# Patient Record
Sex: MALE | Race: WHITE | NOT HISPANIC OR LATINO
[De-identification: names, ages, dates, MRNs, and addresses within clinical notes are randomized per-mention and may not be internally consistent; named-entity substitution may affect disease eponyms.]

---

## 2018-02-23 ENCOUNTER — APPOINTMENT (OUTPATIENT)
Dept: NEPHROLOGY | Facility: CLINIC | Age: 53
End: 2018-02-23
Payer: COMMERCIAL

## 2018-02-23 VITALS — SYSTOLIC BLOOD PRESSURE: 140 MMHG | DIASTOLIC BLOOD PRESSURE: 80 MMHG

## 2018-02-23 VITALS
SYSTOLIC BLOOD PRESSURE: 164 MMHG | HEIGHT: 67 IN | OXYGEN SATURATION: 94 % | WEIGHT: 200.62 LBS | DIASTOLIC BLOOD PRESSURE: 93 MMHG | BODY MASS INDEX: 31.49 KG/M2 | HEART RATE: 83 BPM

## 2018-02-23 DIAGNOSIS — N17.9 ACUTE KIDNEY FAILURE, UNSPECIFIED: ICD-10-CM

## 2018-02-23 PROCEDURE — 99244 OFF/OP CNSLTJ NEW/EST MOD 40: CPT

## 2018-02-23 RX ORDER — TAMSULOSIN HYDROCHLORIDE 0.4 MG/1
0.4 CAPSULE ORAL
Qty: 30 | Refills: 0 | Status: ACTIVE | COMMUNITY
Start: 2018-02-07

## 2018-02-23 RX ORDER — BROMPHENIRAMINE MALEATE, PSEUDOEPHEDRINE HYDROCHLORIDE, 2; 30; 10 MG/5ML; MG/5ML; MG/5ML
30-2-10 SYRUP ORAL
Qty: 150 | Refills: 0 | Status: ACTIVE | COMMUNITY
Start: 2018-01-14

## 2018-02-26 LAB
ALBUMIN SERPL ELPH-MCNC: 4.2 G/DL
ANION GAP SERPL CALC-SCNC: 12 MMOL/L
APPEARANCE: CLEAR
BACTERIA: NEGATIVE
BILIRUBIN URINE: NEGATIVE
BLOOD URINE: NEGATIVE
BUN SERPL-MCNC: 22 MG/DL
CALCIUM SERPL-MCNC: 10 MG/DL
CHLORIDE SERPL-SCNC: 100 MMOL/L
CO2 SERPL-SCNC: 28 MMOL/L
COLOR: YELLOW
CREAT SERPL-MCNC: 1.16 MG/DL
CREAT SPEC-SCNC: 82 MG/DL
CREAT/PROT UR: 0 RATIO
GLUCOSE QUALITATIVE U: NEGATIVE MG/DL
GLUCOSE SERPL-MCNC: 122 MG/DL
HYALINE CASTS: 0 /LPF
KETONES URINE: NEGATIVE
LEUKOCYTE ESTERASE URINE: NEGATIVE
MICROSCOPIC-UA: NORMAL
NITRITE URINE: NEGATIVE
PH URINE: 5
PHOSPHATE SERPL-MCNC: 2.5 MG/DL
POTASSIUM SERPL-SCNC: 4.3 MMOL/L
PROT UR-MCNC: 4 MG/DL
PROTEIN URINE: NEGATIVE MG/DL
RED BLOOD CELLS URINE: 3 /HPF
SODIUM SERPL-SCNC: 140 MMOL/L
SPECIFIC GRAVITY URINE: 1.02
SQUAMOUS EPITHELIAL CELLS: 0 /HPF
UROBILINOGEN URINE: NEGATIVE MG/DL
WHITE BLOOD CELLS URINE: 2 /HPF

## 2019-10-17 ENCOUNTER — OUTPATIENT (OUTPATIENT)
Dept: OUTPATIENT SERVICES | Facility: HOSPITAL | Age: 54
LOS: 1 days | End: 2019-10-17
Payer: COMMERCIAL

## 2019-10-17 ENCOUNTER — APPOINTMENT (OUTPATIENT)
Dept: CT IMAGING | Facility: CLINIC | Age: 54
End: 2019-10-17
Payer: COMMERCIAL

## 2019-10-17 DIAGNOSIS — Z00.8 ENCOUNTER FOR OTHER GENERAL EXAMINATION: ICD-10-CM

## 2019-10-17 DIAGNOSIS — Z98.89 OTHER SPECIFIED POSTPROCEDURAL STATES: Chronic | ICD-10-CM

## 2019-10-17 PROCEDURE — 70486 CT MAXILLOFACIAL W/O DYE: CPT

## 2019-10-17 PROCEDURE — 70486 CT MAXILLOFACIAL W/O DYE: CPT | Mod: 26

## 2021-02-08 ENCOUNTER — APPOINTMENT (OUTPATIENT)
Dept: ORTHOPEDIC SURGERY | Facility: CLINIC | Age: 56
End: 2021-02-08
Payer: MEDICARE

## 2021-02-08 VITALS — BODY MASS INDEX: 31.39 KG/M2 | TEMPERATURE: 97.9 F | WEIGHT: 200 LBS | HEIGHT: 67 IN

## 2021-02-08 DIAGNOSIS — M54.5 LOW BACK PAIN: ICD-10-CM

## 2021-02-08 DIAGNOSIS — Z80.9 FAMILY HISTORY OF MALIGNANT NEOPLASM, UNSPECIFIED: ICD-10-CM

## 2021-02-08 PROCEDURE — 99204 OFFICE O/P NEW MOD 45 MIN: CPT

## 2021-02-09 PROBLEM — Z80.9 FAMILY HISTORY OF MALIGNANT NEOPLASM: Status: ACTIVE | Noted: 2021-02-08

## 2021-02-09 RX ORDER — HYDROCODONE BITARTRATE AND ACETAMINOPHEN 10; 325 MG/1; MG/1
10-325 TABLET ORAL
Qty: 120 | Refills: 0 | Status: ACTIVE | COMMUNITY
Start: 2021-01-02

## 2021-02-09 RX ORDER — IBUPROFEN 800 MG/1
800 TABLET, FILM COATED ORAL
Qty: 14 | Refills: 0 | Status: ACTIVE | COMMUNITY
Start: 2020-12-22

## 2021-02-09 RX ORDER — HYDROCODONE BITARTRATE AND ACETAMINOPHEN 7.5; 325 MG/1; MG/1
7.5-325 TABLET ORAL
Qty: 60 | Refills: 0 | Status: COMPLETED | COMMUNITY
Start: 2020-12-11

## 2021-02-09 RX ORDER — OXYCODONE AND ACETAMINOPHEN 10; 325 MG/1; MG/1
10-325 TABLET ORAL
Qty: 28 | Refills: 0 | Status: COMPLETED | COMMUNITY
Start: 2020-12-22

## 2021-02-09 RX ORDER — DICLOFENAC SODIUM 1% 10 MG/G
1 GEL TOPICAL
Qty: 400 | Refills: 0 | Status: COMPLETED | COMMUNITY
Start: 2020-10-15

## 2021-04-26 ENCOUNTER — APPOINTMENT (OUTPATIENT)
Dept: ORTHOPEDIC SURGERY | Facility: CLINIC | Age: 56
End: 2021-04-26
Payer: MEDICARE

## 2021-04-26 VITALS
HEIGHT: 67 IN | SYSTOLIC BLOOD PRESSURE: 158 MMHG | HEART RATE: 76 BPM | BODY MASS INDEX: 29.19 KG/M2 | TEMPERATURE: 98 F | WEIGHT: 186 LBS | DIASTOLIC BLOOD PRESSURE: 104 MMHG

## 2021-04-26 DIAGNOSIS — M51.26 OTHER INTERVERTEBRAL DISC DISPLACEMENT, LUMBAR REGION: ICD-10-CM

## 2021-04-26 DIAGNOSIS — M48.062 SPINAL STENOSIS, LUMBAR REGION WITH NEUROGENIC CLAUDICATION: ICD-10-CM

## 2021-04-26 PROCEDURE — 99214 OFFICE O/P EST MOD 30 MIN: CPT

## 2021-04-29 ENCOUNTER — OUTPATIENT (OUTPATIENT)
Dept: OUTPATIENT SERVICES | Facility: HOSPITAL | Age: 56
LOS: 1 days | End: 2021-04-29
Payer: MEDICARE

## 2021-04-29 VITALS
DIASTOLIC BLOOD PRESSURE: 70 MMHG | WEIGHT: 194.01 LBS | RESPIRATION RATE: 14 BRPM | HEART RATE: 80 BPM | OXYGEN SATURATION: 97 % | HEIGHT: 66 IN | SYSTOLIC BLOOD PRESSURE: 129 MMHG | TEMPERATURE: 97 F

## 2021-04-29 DIAGNOSIS — Z98.89 OTHER SPECIFIED POSTPROCEDURAL STATES: Chronic | ICD-10-CM

## 2021-04-29 DIAGNOSIS — J86.9 PYOTHORAX WITHOUT FISTULA: Chronic | ICD-10-CM

## 2021-04-29 DIAGNOSIS — Z96.651 PRESENCE OF RIGHT ARTIFICIAL KNEE JOINT: Chronic | ICD-10-CM

## 2021-04-29 DIAGNOSIS — M48.062 SPINAL STENOSIS, LUMBAR REGION WITH NEUROGENIC CLAUDICATION: ICD-10-CM

## 2021-04-29 DIAGNOSIS — Z98.890 OTHER SPECIFIED POSTPROCEDURAL STATES: Chronic | ICD-10-CM

## 2021-04-29 DIAGNOSIS — Z01.818 ENCOUNTER FOR OTHER PREPROCEDURAL EXAMINATION: ICD-10-CM

## 2021-04-29 DIAGNOSIS — M51.26 OTHER INTERVERTEBRAL DISC DISPLACEMENT, LUMBAR REGION: ICD-10-CM

## 2021-04-29 LAB
ALBUMIN SERPL ELPH-MCNC: 3.8 G/DL — SIGNIFICANT CHANGE UP (ref 3.3–5)
ALP SERPL-CCNC: 57 U/L — SIGNIFICANT CHANGE UP (ref 30–120)
ALT FLD-CCNC: 41 U/L DA — SIGNIFICANT CHANGE UP (ref 10–60)
ANION GAP SERPL CALC-SCNC: 9 MMOL/L — SIGNIFICANT CHANGE UP (ref 5–17)
APTT BLD: 34 SEC — SIGNIFICANT CHANGE UP (ref 27.5–35.5)
AST SERPL-CCNC: 21 U/L — SIGNIFICANT CHANGE UP (ref 10–40)
BILIRUB SERPL-MCNC: 0.2 MG/DL — SIGNIFICANT CHANGE UP (ref 0.2–1.2)
BLD GP AB SCN SERPL QL: SIGNIFICANT CHANGE UP
BUN SERPL-MCNC: 19 MG/DL — SIGNIFICANT CHANGE UP (ref 7–23)
CALCIUM SERPL-MCNC: 9.6 MG/DL — SIGNIFICANT CHANGE UP (ref 8.4–10.5)
CHLORIDE SERPL-SCNC: 103 MMOL/L — SIGNIFICANT CHANGE UP (ref 96–108)
CO2 SERPL-SCNC: 30 MMOL/L — SIGNIFICANT CHANGE UP (ref 22–31)
CREAT SERPL-MCNC: 0.96 MG/DL — SIGNIFICANT CHANGE UP (ref 0.5–1.3)
GLUCOSE SERPL-MCNC: 143 MG/DL — HIGH (ref 70–99)
HCT VFR BLD CALC: 48.5 % — SIGNIFICANT CHANGE UP (ref 39–50)
HGB BLD-MCNC: 16.2 G/DL — SIGNIFICANT CHANGE UP (ref 13–17)
INR BLD: 0.98 RATIO — SIGNIFICANT CHANGE UP (ref 0.88–1.16)
MCHC RBC-ENTMCNC: 30.9 PG — SIGNIFICANT CHANGE UP (ref 27–34)
MCHC RBC-ENTMCNC: 33.4 GM/DL — SIGNIFICANT CHANGE UP (ref 32–36)
MCV RBC AUTO: 92.4 FL — SIGNIFICANT CHANGE UP (ref 80–100)
NRBC # BLD: 0 /100 WBCS — SIGNIFICANT CHANGE UP (ref 0–0)
PLATELET # BLD AUTO: 258 K/UL — SIGNIFICANT CHANGE UP (ref 150–400)
POTASSIUM SERPL-MCNC: 3.8 MMOL/L — SIGNIFICANT CHANGE UP (ref 3.5–5.3)
POTASSIUM SERPL-SCNC: 3.8 MMOL/L — SIGNIFICANT CHANGE UP (ref 3.5–5.3)
PROT SERPL-MCNC: 7.5 G/DL — SIGNIFICANT CHANGE UP (ref 6–8.3)
PROTHROM AB SERPL-ACNC: 11.9 SEC — SIGNIFICANT CHANGE UP (ref 10.6–13.6)
RBC # BLD: 5.25 M/UL — SIGNIFICANT CHANGE UP (ref 4.2–5.8)
RBC # FLD: 11.6 % — SIGNIFICANT CHANGE UP (ref 10.3–14.5)
SODIUM SERPL-SCNC: 142 MMOL/L — SIGNIFICANT CHANGE UP (ref 135–145)
WBC # BLD: 9.39 K/UL — SIGNIFICANT CHANGE UP (ref 3.8–10.5)
WBC # FLD AUTO: 9.39 K/UL — SIGNIFICANT CHANGE UP (ref 3.8–10.5)

## 2021-04-29 PROCEDURE — 93005 ELECTROCARDIOGRAM TRACING: CPT

## 2021-04-29 PROCEDURE — 93010 ELECTROCARDIOGRAM REPORT: CPT

## 2021-04-29 PROCEDURE — G0463: CPT

## 2021-04-29 NOTE — H&P PST ADULT - VISION (WITH CORRECTIVE LENSES IF THE PATIENT USUALLY WEARS THEM):
+ reading/Partially impaired: cannot see medication labels or newsprint, but can see obstacles in path, and the surrounding layout; can count fingers at arm's length

## 2021-04-29 NOTE — H&P PST ADULT - HISTORY OF PRESENT ILLNESS
.This is a 56 y/o male  .This is a 54 y/o male presents to [facility] with complaint of severe back pain radiating down to left leg with spasms. He has been experiencing stabbing pain since Thanksgiving. Had been treated with MELLY x3 , PT with no relief . scheduled for decompressive laminectomy and discectomy on 5./11/21

## 2021-04-29 NOTE — H&P PST ADULT - PAIN DESCRIPTION (FREQUENCY/QUALITY), PROFILE
mostly w ambulation/aching/cramping/sharp/soreness/spasm/throbbing mostly w ambulation/constant/aching/cramping/sharp/soreness/spasm/throbbing

## 2021-04-29 NOTE — H&P PST ADULT - NSICDXFAMILYHX_GEN_ALL_CORE_FT
FAMILY HISTORY:  Father  Still living? No  Family history of liver cancer, Age at diagnosis: Age Unknown    Mother  Still living? Yes, Estimated age: 71-80  Family history of herpes simplex keratoconjunctivitis, Age at diagnosis: Age Unknown

## 2021-04-29 NOTE — H&P PST ADULT - PRO TOBACCO QUIT ATTEMPTS
oral cessation medication/nicotine replacement therapy oral cessation medication/counseling/nicotine replacement therapy

## 2021-04-29 NOTE — H&P PST ADULT - ASSESSMENT
NPO after Midnight. Take pepcid as ordered. Medical Clearance and Derm note to be obtained.  Nose cx instructions reviewed. Dietary instructions reviewed. 3day wipes reviewed. D/C instructions reviewed.  Patient advised of no jewelry day of surgery.  Handouts given.  Medication reviewed. Stop NSAIDS, ASA herbals, vit E per PMD instructions or 7 days prior to surgery .  O.A. D.J.D right knee Day of Surgery you can take the following meds with a small sip of water. lisinopril and hydrocodone 56 y/o male with lumbar radiculopathy     scheduled for decompressive laminectomy and discectomy   Medical clearance   Pre op instructions  COVID testing on 5/9/21 at 11am

## 2021-04-29 NOTE — H&P PST ADULT - NSICDXPASTMEDICALHX_GEN_ALL_CORE_FT
PAST MEDICAL HISTORY:  Allergic rhinitis     Asthmatic bronchitis     Chronic back pain greater than 3 months duration takes hydrocodone    Hypertension      PAST MEDICAL HISTORY:  Allergic rhinitis     Asthmatic bronchitis     Current smoker     Hypertension     Lumbar herniated disc     Lumbar radiculopathy     Psoriasis

## 2021-04-29 NOTE — H&P PST ADULT - MUSCULOSKELETAL
right knee and lumbar/decreased ROM/decreased ROM due to pain details… detailed exam decreased ROM/decreased ROM due to pain

## 2021-04-29 NOTE — H&P PST ADULT - OTHER CARE PROVIDERS
Dr Oral Disla  Dr Shepherd [phone]  Derm Dr Oral Disla  pain management Dr Shepherd [phone]  Derm

## 2021-04-29 NOTE — H&P PST ADULT - NSICDXPASTSURGICALHX_GEN_ALL_CORE_FT
PAST SURGICAL HISTORY:  Empyema, right VATs procedule    S/P colonoscopy     S/P total knee replacement, right 2015     PAST SURGICAL HISTORY:  History of thoracic surgery for empyema VATS ? 2018 hospitalized  at [facility]    S/P total knee replacement, right 2015

## 2021-05-06 PROBLEM — L40.9 PSORIASIS, UNSPECIFIED: Chronic | Status: ACTIVE | Noted: 2021-04-29

## 2021-05-06 PROBLEM — F17.200 NICOTINE DEPENDENCE, UNSPECIFIED, UNCOMPLICATED: Chronic | Status: ACTIVE | Noted: 2021-04-29

## 2021-05-06 PROBLEM — M54.16 RADICULOPATHY, LUMBAR REGION: Chronic | Status: ACTIVE | Noted: 2021-04-29

## 2021-05-06 PROBLEM — M51.26 OTHER INTERVERTEBRAL DISC DISPLACEMENT, LUMBAR REGION: Chronic | Status: ACTIVE | Noted: 2021-04-29

## 2021-05-06 NOTE — PROVIDER CONTACT NOTE (OTHER) - ASSESSMENT
The Spine Pre-Operative Education packet was given to the patient on 4/26/21. The patient and NP reviewed the information included in the packet. All his questions were answered and he gave a clear understanding of the instructions. He was advised to call the office at any time with further questions or concerns.

## 2021-05-09 ENCOUNTER — OUTPATIENT (OUTPATIENT)
Dept: OUTPATIENT SERVICES | Facility: HOSPITAL | Age: 56
LOS: 1 days | End: 2021-05-09
Payer: MEDICARE

## 2021-05-09 DIAGNOSIS — Z01.818 ENCOUNTER FOR OTHER PREPROCEDURAL EXAMINATION: ICD-10-CM

## 2021-05-09 DIAGNOSIS — Z20.828 CONTACT WITH AND (SUSPECTED) EXPOSURE TO OTHER VIRAL COMMUNICABLE DISEASES: ICD-10-CM

## 2021-05-09 DIAGNOSIS — Z98.890 OTHER SPECIFIED POSTPROCEDURAL STATES: Chronic | ICD-10-CM

## 2021-05-09 DIAGNOSIS — M48.062 SPINAL STENOSIS, LUMBAR REGION WITH NEUROGENIC CLAUDICATION: ICD-10-CM

## 2021-05-09 DIAGNOSIS — M51.26 OTHER INTERVERTEBRAL DISC DISPLACEMENT, LUMBAR REGION: ICD-10-CM

## 2021-05-09 DIAGNOSIS — Z96.651 PRESENCE OF RIGHT ARTIFICIAL KNEE JOINT: Chronic | ICD-10-CM

## 2021-05-09 LAB — SARS-COV-2 RNA SPEC QL NAA+PROBE: SIGNIFICANT CHANGE UP

## 2021-05-09 PROCEDURE — U0003: CPT

## 2021-05-09 PROCEDURE — U0005: CPT

## 2021-05-10 ENCOUNTER — APPOINTMENT (OUTPATIENT)
Dept: ORTHOPEDIC SURGERY | Facility: CLINIC | Age: 56
End: 2021-05-10

## 2021-05-10 ENCOUNTER — TRANSCRIPTION ENCOUNTER (OUTPATIENT)
Age: 56
End: 2021-05-10

## 2021-05-10 NOTE — ASU PATIENT PROFILE, ADULT - PSH
History of thoracic surgery  for empyema VATS ? 2018 hospitalized  at [facility]  S/P total knee replacement, right  2015

## 2021-05-10 NOTE — ASU PATIENT PROFILE, ADULT - PMH
Allergic rhinitis    Asthmatic bronchitis    Current smoker    Hypertension    Lumbar herniated disc    Lumbar radiculopathy    Psoriasis

## 2021-05-11 ENCOUNTER — OUTPATIENT (OUTPATIENT)
Dept: OUTPATIENT SERVICES | Facility: HOSPITAL | Age: 56
LOS: 1 days | End: 2021-05-11
Payer: MEDICARE

## 2021-05-11 ENCOUNTER — RESULT REVIEW (OUTPATIENT)
Age: 56
End: 2021-05-11

## 2021-05-11 ENCOUNTER — APPOINTMENT (OUTPATIENT)
Dept: ORTHOPEDIC SURGERY | Facility: HOSPITAL | Age: 56
End: 2021-05-11

## 2021-05-11 VITALS
DIASTOLIC BLOOD PRESSURE: 77 MMHG | TEMPERATURE: 98 F | HEART RATE: 79 BPM | RESPIRATION RATE: 16 BRPM | SYSTOLIC BLOOD PRESSURE: 135 MMHG | WEIGHT: 189.6 LBS | HEIGHT: 65 IN | OXYGEN SATURATION: 97 %

## 2021-05-11 VITALS
DIASTOLIC BLOOD PRESSURE: 95 MMHG | HEART RATE: 85 BPM | OXYGEN SATURATION: 96 % | RESPIRATION RATE: 17 BRPM | SYSTOLIC BLOOD PRESSURE: 121 MMHG

## 2021-05-11 DIAGNOSIS — Z98.890 OTHER SPECIFIED POSTPROCEDURAL STATES: Chronic | ICD-10-CM

## 2021-05-11 DIAGNOSIS — Z01.818 ENCOUNTER FOR OTHER PREPROCEDURAL EXAMINATION: ICD-10-CM

## 2021-05-11 DIAGNOSIS — Z96.651 PRESENCE OF RIGHT ARTIFICIAL KNEE JOINT: Chronic | ICD-10-CM

## 2021-05-11 DIAGNOSIS — M48.062 SPINAL STENOSIS, LUMBAR REGION WITH NEUROGENIC CLAUDICATION: ICD-10-CM

## 2021-05-11 DIAGNOSIS — M51.26 OTHER INTERVERTEBRAL DISC DISPLACEMENT, LUMBAR REGION: ICD-10-CM

## 2021-05-11 PROCEDURE — 63030 LAMOT DCMPRN NRV RT 1 LMBR: CPT | Mod: 59,LT

## 2021-05-11 PROCEDURE — 63030 LAMOT DCMPRN NRV RT 1 LMBR: CPT | Mod: 82,59,LT

## 2021-05-11 PROCEDURE — 88304 TISSUE EXAM BY PATHOLOGIST: CPT | Mod: 26

## 2021-05-11 PROCEDURE — 63267 EXCISE INTRSPINL LESION LMBR: CPT

## 2021-05-11 PROCEDURE — 88311 DECALCIFY TISSUE: CPT

## 2021-05-11 PROCEDURE — 88311 DECALCIFY TISSUE: CPT | Mod: 26

## 2021-05-11 PROCEDURE — 76000 FLUOROSCOPY <1 HR PHYS/QHP: CPT

## 2021-05-11 PROCEDURE — 63047 LAM FACETEC & FORAMOT LUMBAR: CPT

## 2021-05-11 PROCEDURE — C1889: CPT

## 2021-05-11 PROCEDURE — 88304 TISSUE EXAM BY PATHOLOGIST: CPT

## 2021-05-11 PROCEDURE — 63048 LAM FACETEC &FORAMOT EA ADDL: CPT

## 2021-05-11 PROCEDURE — 97161 PT EVAL LOW COMPLEX 20 MIN: CPT

## 2021-05-11 PROCEDURE — 63267 EXCISE INTRSPINL LESION LMBR: CPT | Mod: 82

## 2021-05-11 RX ORDER — APREPITANT 80 MG/1
40 CAPSULE ORAL ONCE
Refills: 0 | Status: COMPLETED | OUTPATIENT
Start: 2021-05-11 | End: 2021-05-11

## 2021-05-11 RX ORDER — CEFAZOLIN SODIUM 1 G
2000 VIAL (EA) INJECTION ONCE
Refills: 0 | Status: DISCONTINUED | OUTPATIENT
Start: 2021-05-11 | End: 2021-05-11

## 2021-05-11 RX ORDER — ONDANSETRON 8 MG/1
4 TABLET, FILM COATED ORAL ONCE
Refills: 0 | Status: COMPLETED | OUTPATIENT
Start: 2021-05-11 | End: 2021-05-11

## 2021-05-11 RX ORDER — DIAZEPAM 5 MG
1 TABLET ORAL
Qty: 10 | Refills: 0
Start: 2021-05-11

## 2021-05-11 RX ORDER — OXYCODONE HYDROCHLORIDE 5 MG/1
5 TABLET ORAL ONCE
Refills: 0 | Status: DISCONTINUED | OUTPATIENT
Start: 2021-05-11 | End: 2021-05-11

## 2021-05-11 RX ORDER — HYDROMORPHONE HYDROCHLORIDE 2 MG/ML
0.5 INJECTION INTRAMUSCULAR; INTRAVENOUS; SUBCUTANEOUS
Refills: 0 | Status: DISCONTINUED | OUTPATIENT
Start: 2021-05-11 | End: 2021-05-11

## 2021-05-11 RX ORDER — ALBUTEROL 90 UG/1
2 AEROSOL, METERED ORAL
Qty: 0 | Refills: 0 | DISCHARGE

## 2021-05-11 RX ORDER — DIAZEPAM 5 MG
5 TABLET ORAL ONCE
Refills: 0 | Status: DISCONTINUED | OUTPATIENT
Start: 2021-05-11 | End: 2021-05-11

## 2021-05-11 RX ORDER — SODIUM CHLORIDE 9 MG/ML
1000 INJECTION, SOLUTION INTRAVENOUS
Refills: 0 | Status: DISCONTINUED | OUTPATIENT
Start: 2021-05-11 | End: 2021-05-11

## 2021-05-11 RX ADMIN — OXYCODONE HYDROCHLORIDE 5 MILLIGRAM(S): 5 TABLET ORAL at 12:33

## 2021-05-11 RX ADMIN — HYDROMORPHONE HYDROCHLORIDE 0.5 MILLIGRAM(S): 2 INJECTION INTRAMUSCULAR; INTRAVENOUS; SUBCUTANEOUS at 10:30

## 2021-05-11 RX ADMIN — SODIUM CHLORIDE 75 MILLILITER(S): 9 INJECTION, SOLUTION INTRAVENOUS at 10:30

## 2021-05-11 RX ADMIN — ONDANSETRON 4 MILLIGRAM(S): 8 TABLET, FILM COATED ORAL at 10:30

## 2021-05-11 RX ADMIN — HYDROMORPHONE HYDROCHLORIDE 0.5 MILLIGRAM(S): 2 INJECTION INTRAMUSCULAR; INTRAVENOUS; SUBCUTANEOUS at 10:50

## 2021-05-11 RX ADMIN — OXYCODONE HYDROCHLORIDE 5 MILLIGRAM(S): 5 TABLET ORAL at 11:55

## 2021-05-11 RX ADMIN — APREPITANT 40 MILLIGRAM(S): 80 CAPSULE ORAL at 07:42

## 2021-05-11 NOTE — ASU DISCHARGE PLAN (ADULT/PEDIATRIC) - CALL YOUR DOCTOR IF YOU HAVE ANY OF THE FOLLOWING:
Bleeding that does not stop/Fever greater than (need to indicate Fahrenheit or Celsius)/Wound/Surgical Site with redness, or foul smelling discharge or pus/Unable to urinate/Inability to tolerate liquids or foods Bleeding that does not stop/Pain not relieved by Medications/Fever greater than (need to indicate Fahrenheit or Celsius)/Wound/Surgical Site with redness, or foul smelling discharge or pus/Numbness, tingling, color or temperature change to extremity/Unable to urinate/Inability to tolerate liquids or foods

## 2021-05-11 NOTE — PHYSICAL THERAPY INITIAL EVALUATION ADULT - ADDITIONAL COMMENTS
Pt lives in private home with no MARIZA and 12 steps inside +HR. Pt states he occasionally brought a SAC with him for outdoor ambulation but rarely used.

## 2021-05-11 NOTE — BRIEF OPERATIVE NOTE - NSICDXBRIEFPROCEDURE_GEN_ALL_CORE_FT
PROCEDURES:  Lumbar laminectomy with microdiscectomy 11-May-2021 10:24:20 Left L5-S1 and L4-5 left laminectomy Virgilio Peralta

## 2021-05-11 NOTE — ASU DISCHARGE PLAN (ADULT/PEDIATRIC) - ASU DC SPECIAL INSTRUCTIONSFT
You have just had spine surgery.  Please take care of your back by adhering to some basic recommendations.    Continue your pain medication per your pain management provider.     Your surgeon recommends taking acetaminophen (tylenol) 1000mg 3 times per day for the next 14 to 21 days if you do not use any other acetaminophen  (Tylenol) containing product.  Do NOT use tylenol(acetaminophen) if using Vicodin (hydrocodone/acetaminophen.)    You have been given a prescription for diazepam to use if you have muscle spasms.      Apply icepacks to the surgical area to reduce swelling and discomfort.  This can help to minimize the need for stronger medications.    No heavy lifting. Do not lift more than 10 pounds.  Avoid twisting and bending over.  Do NOT drive a car.  You may be driven in a car.    You may shower as the dressing is the clear plastic type.   Change dressing with dry, sterile gauze and tape if it becomes loose, wet or soiled.  Cover any other dressing with plastic secured with tape when showering.       Observe low back precautions as described by the Physical Therapist.  Walking is your best exercise.  It is best not to remain in one position for long periods such as long car rides.    Constipation is a common problem associated with surgery and pain medications.  You should ensure that you are drinking plenty of fluids and increasing your fruit and vegetable intake.  You are advised to take Docusate 100mg three times per day to prevent opioid induced constipation.  To treat opioid induced constipation use Senna (Senokot) or Bisacodyl (Dulcolax) or PEG 3350 (Miralax) every evening until your first bowel movement and then every evening as needed.  To treat opioid induced bloating and gas pain use Simethicone (Gas-X) 80 mg per label directions.     Smoking should be avoided.  Tobacco products are associated with back problems.       Call the doctor with questions, fevers, severe headache, bowel or bladder dysfunction, new numbness/weakness or new pain not relieved by medication, ice pack and change of position.    You should see your surgeon for follow up within 14 days of surgery.

## 2021-05-11 NOTE — BRIEF OPERATIVE NOTE - NSICDXBRIEFPOSTOP_GEN_ALL_CORE_FT
POST-OP DIAGNOSIS:  Spinal stenosis, lumbar region without neurogenic claudication 11-May-2021 10:27:29 L4-5 Left , L5-S1 Left Virgilio Peralta  HNP (herniated nucleus pulposus), lumbar 11-May-2021 10:27:19 L5-S1 left Virgilio Peralta

## 2021-05-11 NOTE — BRIEF OPERATIVE NOTE - NSICDXBRIEFPREOP_GEN_ALL_CORE_FT
PRE-OP DIAGNOSIS:  HNP (herniated nucleus pulposus), lumbar 11-May-2021 10:26:46 L5-S1 left Virgilio Peralta  Spinal stenosis, lumbar region without neurogenic claudication 11-May-2021 10:26:20 L4-5 Left , L5-S1 Left Virgilio Peralta

## 2021-05-11 NOTE — ASU PREOP CHECKLIST - VIA
Care After Your Cataract Surgery  Dr. Khalil, Dr. Pat  [phone] or [phone]  Activity  • For the next 24 hours: Do not stay alone, have a responsible adult with you overnight.  The effects of anesthesia may stay with you for as long as 24 hours.     • Do not drive a car, operate electrical/power tools or appliances.  Do not lift more than 10 pounds. If a child, no bicycle riding, gymnastics, swimming, etc.    • Do not drink alcohol, including beer. Alcohol enhances the effect of anesthesia and sedation.    • Do not sign any legal papers.    Bathing  • You may bathe normally.    Diet   • You may eat a regular diet.    Medications  Take one Diamox pill when you get home from surgery.  Then take one pill every six hours until gone.  You do not have to get up during the night to take a pill.  Do not take if allergic to Sulfa.  Bring these instructions and your medications with you to your office visits.        Special Instructions:  • You may bend over.    • Do not rub the eye.    • The local anesthetic will gradually wear off today.  You may experience a headache or pricking sensation around the eye.  You may take Tylenol every 4 hours as needed for pain.    • If you used drops prior to surgery, continue to use them as directed.   •  If you wear glasses, wear those during the day; they will protect your eye, wear sunglasses in bright light (sun or snow glare).    • Use the shield during the first night after surgery    Call the doctor if you have:  • Severe pain.  • Extreme change in vision.    • Increase in redness in and around the eye.    
stretcher

## 2021-05-12 ENCOUNTER — NON-APPOINTMENT (OUTPATIENT)
Age: 56
End: 2021-05-12

## 2021-05-18 ENCOUNTER — NON-APPOINTMENT (OUTPATIENT)
Age: 56
End: 2021-05-18

## 2021-05-18 RX ORDER — DIAZEPAM 5 MG/1
5 TABLET ORAL
Qty: 20 | Refills: 0 | Status: ACTIVE | COMMUNITY
Start: 2021-05-18 | End: 1900-01-01

## 2021-05-26 ENCOUNTER — APPOINTMENT (OUTPATIENT)
Dept: ORTHOPEDIC SURGERY | Facility: CLINIC | Age: 56
End: 2021-05-26
Payer: MEDICARE

## 2021-05-26 VITALS — HEART RATE: 72 BPM | TEMPERATURE: 97.8 F | DIASTOLIC BLOOD PRESSURE: 83 MMHG | SYSTOLIC BLOOD PRESSURE: 131 MMHG

## 2021-05-26 PROCEDURE — 72100 X-RAY EXAM L-S SPINE 2/3 VWS: CPT

## 2021-05-26 PROCEDURE — 99024 POSTOP FOLLOW-UP VISIT: CPT

## 2021-07-07 ENCOUNTER — APPOINTMENT (OUTPATIENT)
Dept: ORTHOPEDIC SURGERY | Facility: CLINIC | Age: 56
End: 2021-07-07
Payer: MEDICARE

## 2021-07-07 VITALS — SYSTOLIC BLOOD PRESSURE: 133 MMHG | DIASTOLIC BLOOD PRESSURE: 82 MMHG | HEART RATE: 59 BPM

## 2021-07-07 DIAGNOSIS — Z98.890 OTHER SPECIFIED POSTPROCEDURAL STATES: ICD-10-CM

## 2021-07-07 PROCEDURE — 99024 POSTOP FOLLOW-UP VISIT: CPT

## 2022-03-02 ENCOUNTER — NON-APPOINTMENT (OUTPATIENT)
Age: 57
End: 2022-03-02

## 2022-03-02 ENCOUNTER — APPOINTMENT (OUTPATIENT)
Dept: ORTHOPEDIC SURGERY | Facility: CLINIC | Age: 57
End: 2022-03-02
Payer: MEDICARE

## 2022-03-02 VITALS — HEART RATE: 66 BPM | SYSTOLIC BLOOD PRESSURE: 139 MMHG | DIASTOLIC BLOOD PRESSURE: 88 MMHG

## 2022-03-02 DIAGNOSIS — M54.2 CERVICALGIA: ICD-10-CM

## 2022-03-02 PROCEDURE — 72050 X-RAY EXAM NECK SPINE 4/5VWS: CPT

## 2022-03-02 PROCEDURE — 99214 OFFICE O/P EST MOD 30 MIN: CPT

## 2022-03-02 RX ORDER — MELOXICAM 15 MG/1
15 TABLET ORAL
Qty: 30 | Refills: 2 | Status: ACTIVE | COMMUNITY
Start: 2022-03-02 | End: 1900-01-01

## 2022-03-14 ENCOUNTER — APPOINTMENT (OUTPATIENT)
Dept: MRI IMAGING | Facility: CLINIC | Age: 57
End: 2022-03-14
Payer: MEDICARE

## 2022-03-14 ENCOUNTER — OUTPATIENT (OUTPATIENT)
Dept: OUTPATIENT SERVICES | Facility: HOSPITAL | Age: 57
LOS: 1 days | End: 2022-03-14
Payer: MEDICARE

## 2022-03-14 DIAGNOSIS — M47.812 SPONDYLOSIS WITHOUT MYELOPATHY OR RADICULOPATHY, CERVICAL REGION: ICD-10-CM

## 2022-03-14 DIAGNOSIS — Z98.890 OTHER SPECIFIED POSTPROCEDURAL STATES: Chronic | ICD-10-CM

## 2022-03-14 DIAGNOSIS — Z96.651 PRESENCE OF RIGHT ARTIFICIAL KNEE JOINT: Chronic | ICD-10-CM

## 2022-03-14 PROCEDURE — G1004: CPT

## 2022-03-14 PROCEDURE — 72141 MRI NECK SPINE W/O DYE: CPT | Mod: ME

## 2022-03-14 PROCEDURE — 72141 MRI NECK SPINE W/O DYE: CPT | Mod: 26,ME

## 2022-03-23 ENCOUNTER — APPOINTMENT (OUTPATIENT)
Dept: ORTHOPEDIC SURGERY | Facility: CLINIC | Age: 57
End: 2022-03-23
Payer: MEDICARE

## 2022-03-23 VITALS — DIASTOLIC BLOOD PRESSURE: 89 MMHG | HEART RATE: 62 BPM | SYSTOLIC BLOOD PRESSURE: 152 MMHG

## 2022-03-23 DIAGNOSIS — M47.812 SPONDYLOSIS W/OUT MYELOPATHY OR RADICULOPATHY, CERVICAL REGION: ICD-10-CM

## 2022-03-23 PROCEDURE — 99214 OFFICE O/P EST MOD 30 MIN: CPT

## 2023-01-03 ENCOUNTER — OFFICE (OUTPATIENT)
Dept: URBAN - METROPOLITAN AREA CLINIC 35 | Facility: CLINIC | Age: 58
Setting detail: OPHTHALMOLOGY
End: 2023-01-03
Payer: MEDICARE

## 2023-01-03 DIAGNOSIS — H10.9: ICD-10-CM

## 2023-01-03 DIAGNOSIS — H02.402: ICD-10-CM

## 2023-01-03 PROCEDURE — 92002 INTRM OPH EXAM NEW PATIENT: CPT | Performed by: OPHTHALMOLOGY

## 2023-01-03 ASSESSMENT — REFRACTION_CURRENTRX
OD_CYLINDER: 0.00
OD_OVR_VA: 20/
OD_VPRISM_DIRECTION: SV
OS_VPRISM_DIRECTION: SV
OD_SPHERE: +1.75
OS_VPRISM_DIRECTION: SV
OD_VPRISM_DIRECTION: SV
OS_OVR_VA: 20/
OS_CYLINDER: SPHERE
OD_SPHERE: +2.75
OD_OVR_VA: 20/
OS_OVR_VA: 20/
OS_SPHERE: +1.75
OS_CYLINDER: 0.00
OD_CYLINDER: SPHERE
OS_SPHERE: +2.75

## 2023-01-03 ASSESSMENT — VISUAL ACUITY
OS_BCVA: 20/40
OD_BCVA: 20/40+2

## 2023-01-03 ASSESSMENT — REFRACTION_MANIFEST
OD_SPHERE: +0.75
OS_SPHERE: +0.75
OD_VA1: 20/20
OS_CYLINDER: SPHERE
OS_ADD: +2.00
OD_ADD: +2.00
OS_VA1: 20/20
OD_CYLINDER: SPHERE

## 2023-01-03 ASSESSMENT — KERATOMETRY
OD_K1POWER_DIOPTERS: 43.50
METHOD_AUTO_MANUAL: AUTO
OS_K2POWER_DIOPTERS: 43.75
OD_K2POWER_DIOPTERS: 44.50
OS_AXISANGLE_DEGREES: 090
OD_AXISANGLE_DEGREES: 106
OS_K1POWER_DIOPTERS: 43.75

## 2023-01-03 ASSESSMENT — REFRACTION_AUTOREFRACTION
OS_AXIS: 023
OS_CYLINDER: +0.50
OD_CYLINDER: +0.50
OD_AXIS: 126
OD_SPHERE: +0.75
OS_SPHERE: +1.00

## 2023-01-03 ASSESSMENT — AXIALLENGTH_DERIVED
OS_AL: 23.0269
OD_AL: 23.0322

## 2023-01-03 ASSESSMENT — LID POSITION - PTOSIS: OS_PTOSIS: LUL

## 2023-01-03 ASSESSMENT — SPHEQUIV_DERIVED
OD_SPHEQUIV: 1
OS_SPHEQUIV: 1.25

## 2023-11-20 ENCOUNTER — APPOINTMENT (OUTPATIENT)
Dept: ORTHOPEDIC SURGERY | Facility: CLINIC | Age: 58
End: 2023-11-20
Payer: MEDICARE

## 2023-11-20 VITALS — SYSTOLIC BLOOD PRESSURE: 133 MMHG | DIASTOLIC BLOOD PRESSURE: 82 MMHG | HEART RATE: 58 BPM

## 2023-11-20 DIAGNOSIS — Z96.651 PRESENCE OF RIGHT ARTIFICIAL KNEE JOINT: ICD-10-CM

## 2023-11-20 PROCEDURE — 99214 OFFICE O/P EST MOD 30 MIN: CPT

## 2023-11-20 PROCEDURE — 73562 X-RAY EXAM OF KNEE 3: CPT | Mod: RT

## 2024-06-21 ENCOUNTER — APPOINTMENT (OUTPATIENT)
Dept: ORTHOPEDIC SURGERY | Facility: CLINIC | Age: 59
End: 2024-06-21

## 2024-06-21 VITALS — HEIGHT: 67 IN | BODY MASS INDEX: 29.19 KG/M2 | WEIGHT: 186 LBS

## 2024-06-21 DIAGNOSIS — M54.12 RADICULOPATHY, CERVICAL REGION: ICD-10-CM

## 2024-06-24 PROBLEM — M54.12 CERVICAL RADICULOPATHY, ACUTE: Status: ACTIVE | Noted: 2024-06-24

## 2024-06-24 NOTE — HISTORY OF PRESENT ILLNESS
[Dull/Aching] : dull/aching [Localized] : localized [Sharp] : sharp [Intermittent] : intermittent [Household chores] : household chores [Leisure] : leisure [Nothing helps with pain getting better] : Nothing helps with pain getting better [Exercising] : exercising [Retired] : Work status: retired [] : Patient is currently injured and not playing sports: no

## 2024-06-24 NOTE — REASON FOR VISIT
[FreeTextEntry2] : 06/21/2024 :ISAAK TREJOBARB , a 59 year old male, presents today for cervical pain

## 2024-08-12 ENCOUNTER — APPOINTMENT (OUTPATIENT)
Dept: ORTHOPEDIC SURGERY | Facility: CLINIC | Age: 59
End: 2024-08-12
Payer: MEDICARE

## 2024-08-12 DIAGNOSIS — M54.12 RADICULOPATHY, CERVICAL REGION: ICD-10-CM

## 2024-08-12 PROCEDURE — 99205 OFFICE O/P NEW HI 60 MIN: CPT

## 2024-08-12 PROCEDURE — 72050 X-RAY EXAM NECK SPINE 4/5VWS: CPT

## 2024-08-12 NOTE — HISTORY OF PRESENT ILLNESS
[de-identified] : 8/12/24:  58 y/o male with neck pain since 2019 after a low back surgery.   Was seen bty Dr Quintana with PT since.  Has been under the care of neuro Dr Michelle with trigger injections monthly that have helped somewhat.   There is paresthesias to both hands intermittent. Pain worse at night.  Wakes from sleep. EMG upper ext by neuro a few months ago neg per pt Seens Dr. Disla and Wilton from pain management at [facility] with Vicodin and valium with some temp relief.  Failed multiple REMI.   Chiro care for years no adjustments No relief with accupuncture  Has tingling in the lower legs at times   Occupation: [employer]  PMHx Denies No CA Hx  Cervical spine Xrays: C spine with spondylosis   Cervical MRI 2022 [facility] imaging: multiple levels of NF stenosis     [] : no [FreeTextEntry5] : ISAAK ARAUJO  a 59 year M  here for evaluation of his neck. Patient states he has history of lower back pain for many years. Pain has worsened over the past 4 years.

## 2024-08-12 NOTE — HISTORY OF PRESENT ILLNESS
[de-identified] : 8/12/24:  58 y/o male with neck pain since 2019 after a low back surgery.   Was seen bty Dr Quintana with PT since.  Has been under the care of neuro Dr Michelle with trigger injections monthly that have helped somewhat.   There is paresthesias to both hands intermittent. Pain worse at night.  Wakes from sleep. EMG upper ext by neuro a few months ago neg per pt Seens Dr. Disla and Wilton from pain management at [facility] with Vicodin and valium with some temp relief.  Failed multiple REMI.   Chiro care for years no adjustments No relief with accupuncture  Has tingling in the lower legs at times   Occupation: [employer]  PMHx Denies No CA Hx  Cervical spine Xrays: C spine with spondylosis   Cervical MRI 2022 [facility] imaging: multiple levels of NF stenosis     [] : no [FreeTextEntry5] : ISAAK ARAUJO  a 59 year M  here for evaluation of his neck. Patient states he has history of lower back pain for many years. Pain has worsened over the past 4 years.

## 2024-08-12 NOTE — DISCUSSION/SUMMARY
[Medication Risks Reviewed] : Medication risks reviewed [Surgical risks reviewed] : Surgical risks reviewed [de-identified] : reviewed the case and the imaging with the patient  cervical spondylosis with NF narrowing with radiculopathy  discussion of the condition and treatment options cautions discussed questions answered discussion of natural history of the condition and what the next step would be updated MRi   The patient has tried conservative treatment for this condition including time/activity modification/various medications/therapy/injections without significant relief.  Do not suspect that further conservative treatment will lead to a resolution of symptoms.  Patient remains with persistent activity limited symptoms and is therefore considered for surgical intervention   Likely good candidate for acdf - we discussed this surgery  exact levels would be determined based on updated MRI  I have discussed with the patient for an Anterior Cervical Discectomy & Fusion.    We've discussed the surgery details including instrumentation and grafting options (local, allograft, ICBG, and biologics) as well as potential for complications including but not limited to pain, scar, loss of function, permanent injury, death, need for additional surgery, need for blood transfusion, prolonged hospitalization, prolonged recovery, lack of recovery, blood clots, pulmonary embolism, heart attack, stroke, or infection. There is also a possibility for hardware complication such as malposition of hardware, hardware loosening, pullout, failure or fracture of bone, adjacent segment disease, pseudarthrosis, and need for future surgery. Finally, we discussed potential for injury to nerves, spinal cord or blood vessels, paralysis, blindness, need for transfusion, general anesthesia, allergic reaction, prolonged intubation, myocardial infarction, stroke, deep venous thrombosis, pulmonary embolus, and death. The patient understands these things and all questions are answered tohis/her satisfaction.  The surgery may need to be paused or staged or not completed due to intraoperative events or at the surgeon's discretion.  Any injury that occurs may be permanent  Dysphagia is the most common complication and may be permanent.   Spinal fluid leak may occur and may require prolonged time in the hospital or further surgical procedures   Patient has been instructed to stop all Aspirin and NSAIDs 10 days prior to surgery date. For Coumadin and other blood thinners, the patient is referred to the medical doctor   We discussed we will use neuromonitioring for the surgery in order to possibly mitigate risks of injury.   The procedure does not come with a guarantee of success or of satisfaction on the patient's behalf.   At the surgeon's discretion he may call for assistance during the surgery or in the perioperative period  We discussed other surgical options as well but this is the surgery that I think would be best in this case

## 2024-08-12 NOTE — DISCUSSION/SUMMARY
[Medication Risks Reviewed] : Medication risks reviewed [Surgical risks reviewed] : Surgical risks reviewed [de-identified] : reviewed the case and the imaging with the patient  cervical spondylosis with NF narrowing with radiculopathy  discussion of the condition and treatment options cautions discussed questions answered discussion of natural history of the condition and what the next step would be updated MRi   The patient has tried conservative treatment for this condition including time/activity modification/various medications/therapy/injections without significant relief.  Do not suspect that further conservative treatment will lead to a resolution of symptoms.  Patient remains with persistent activity limited symptoms and is therefore considered for surgical intervention   Likely good candidate for acdf - we discussed this surgery  exact levels would be determined based on updated MRI  I have discussed with the patient for an Anterior Cervical Discectomy & Fusion.    We've discussed the surgery details including instrumentation and grafting options (local, allograft, ICBG, and biologics) as well as potential for complications including but not limited to pain, scar, loss of function, permanent injury, death, need for additional surgery, need for blood transfusion, prolonged hospitalization, prolonged recovery, lack of recovery, blood clots, pulmonary embolism, heart attack, stroke, or infection. There is also a possibility for hardware complication such as malposition of hardware, hardware loosening, pullout, failure or fracture of bone, adjacent segment disease, pseudarthrosis, and need for future surgery. Finally, we discussed potential for injury to nerves, spinal cord or blood vessels, paralysis, blindness, need for transfusion, general anesthesia, allergic reaction, prolonged intubation, myocardial infarction, stroke, deep venous thrombosis, pulmonary embolus, and death. The patient understands these things and all questions are answered tohis/her satisfaction.  The surgery may need to be paused or staged or not completed due to intraoperative events or at the surgeon's discretion.  Any injury that occurs may be permanent  Dysphagia is the most common complication and may be permanent.   Spinal fluid leak may occur and may require prolonged time in the hospital or further surgical procedures   Patient has been instructed to stop all Aspirin and NSAIDs 10 days prior to surgery date. For Coumadin and other blood thinners, the patient is referred to the medical doctor   We discussed we will use neuromonitioring for the surgery in order to possibly mitigate risks of injury.   The procedure does not come with a guarantee of success or of satisfaction on the patient's behalf.   At the surgeon's discretion he may call for assistance during the surgery or in the perioperative period  We discussed other surgical options as well but this is the surgery that I think would be best in this case

## 2024-09-20 ENCOUNTER — APPOINTMENT (OUTPATIENT)
Dept: ORTHOPEDIC SURGERY | Facility: CLINIC | Age: 59
End: 2024-09-20
Payer: MEDICARE

## 2024-09-20 DIAGNOSIS — Z98.890 OTHER SPECIFIED POSTPROCEDURAL STATES: ICD-10-CM

## 2024-09-20 DIAGNOSIS — M47.812 SPONDYLOSIS W/OUT MYELOPATHY OR RADICULOPATHY, CERVICAL REGION: ICD-10-CM

## 2024-09-20 DIAGNOSIS — M54.12 RADICULOPATHY, CERVICAL REGION: ICD-10-CM

## 2024-09-20 DIAGNOSIS — M51.36 OTHER INTERVERTEBRAL DISC DEGENERATION, LUMBAR REGION: ICD-10-CM

## 2024-09-20 DIAGNOSIS — M48.062 SPINAL STENOSIS, LUMBAR REGION WITH NEUROGENIC CLAUDICATION: ICD-10-CM

## 2024-09-20 DIAGNOSIS — M51.26 OTHER INTERVERTEBRAL DISC DISPLACEMENT, LUMBAR REGION: ICD-10-CM

## 2024-09-20 PROCEDURE — 72110 X-RAY EXAM L-2 SPINE 4/>VWS: CPT

## 2024-09-20 PROCEDURE — 72170 X-RAY EXAM OF PELVIS: CPT

## 2024-09-20 PROCEDURE — 99215 OFFICE O/P EST HI 40 MIN: CPT

## 2024-09-20 NOTE — DISCUSSION/SUMMARY
[Medication Risks Reviewed] : Medication risks reviewed [Surgical risks reviewed] : Surgical risks reviewed [de-identified] : reviewed the case and the imaging with the patient  neck and lower back pain issues discussion of the condition and treatment options cautions discussed questions answered discussion of natural history of the condition and what the next step would be   Discussed with the patient laminecotomy and/or fusion L4-S1   We've discussed the surgery details including instrumentation and grafting options (local, allograft, ICBG, and biologics) as well as potential for complications including but not limited to pain, scar and infection. There is also a possibility for hardware complication such as malposition of hardware, hardware loosening, pullout, failure or fracture of bone, adjacent segment disease, pseudarthrosis, and need for future surgery. We discussed potential for injury to nerves, spinal cord or blood vessels, paralysis, blindness, need for transfusion, general anesthesia, allergic reaction, prolonged intubation, myocardial infarction, stroke, deep venous thrombosis, pulmonary embolus, and death.  Spinal fluid leak may occur and may require prolonged time in the hospital and also further surgical procedures including drain placement.  The patient understands these things and all questions are answered to his/her satisfaction.  We discussed other surgical options.    The patient will need a medical clearance and pre-admission testing prior to surgery We will use neuromonitoring in order to keep things as safe as possible. The procedure does not come with a guarantee of success or of satisfaction on the patient's behalf At the surgeon's discretion he may call for assistance during the surgery or in the perioperative period   I have discussed with the patient for an Anterior Cervical Discectomy & Fusion C4-7    We've discussed the surgery details including instrumentation and grafting options (local, allograft, ICBG, and biologics) as well as potential for complications including but not limited to pain, scar, loss of function, permanent injury, death, need for additional surgery, need for blood transfusion, prolonged hospitalization, prolonged recovery, lack of recovery, blood clots, pulmonary embolism, heart attack, stroke, or infection. There is also a possibility for hardware complication such as malposition of hardware, hardware loosening, pullout, failure or fracture of bone, adjacent segment disease, pseudarthrosis, and need for future surgery. Finally, we discussed potential for injury to nerves, spinal cord or blood vessels, paralysis, blindness, need for transfusion, general anesthesia, allergic reaction, prolonged intubation, myocardial infarction, stroke, deep venous thrombosis, pulmonary embolus, and death. The patient understands these things and all questions are answered tohis/her satisfaction.  The surgery may need to be paused or staged or not completed due to intraoperative events or at the surgeon's discretion.  Any injury that occurs may be permanent Dysphagia is the most common complication and may be permanent.  Spinal fluid leak may occur and may require prolonged time in the hospital or further surgical procedures  Patient has been instructed to stop all Aspirin and NSAIDs 10 days prior to surgery date. For Coumadin and other blood thinners, the patient is referred to the medical doctor We discussed we will use neuromonitioring for the surgery in order to possibly mitigate risks of injury. The procedure does not come with a guarantee of success or of satisfaction on the patient's behalf. At the surgeon's discretion he may call for assistance during the surgery or in the perioperative period We discussed other surgical options as well but this is the surgery that I think would be best in this case  he is going to go to colonscopy first and follow up and we will likely book surgery then

## 2024-09-20 NOTE — HISTORY OF PRESENT ILLNESS
[de-identified] : 8/12/24:  60 y/o male with neck pain since 2019 after a low back surgery.   Was seen bty Dr Quintana with PT since.  Has been under the care of neuro Dr Michelle with trigger injections monthly that have helped somewhat.   There is paresthesias to both hands intermittent. Pain worse at night.  Wakes from sleep. EMG upper ext by neuro a few months ago neg per pt Seens Dr. Disla and Wilton from pain management at [facility] with Vicodin and valium with some temp relief.  Failed multiple REMI.   Chiro care for years no adjustments No relief with accupuncture  Has tingling in the lower legs at times   Occupation: [employer]  PMHx Denies No CA Hx  Cervical spine Xrays: C spine with spondylosis   Cervical MRI 2022 [facility] imaging: multiple levels of NF stenosis   -------------------------------------------------------------------------------  09/20/24 - MRI review of cervical / lumbar.  pt states pain continues. Plan at last was "reviewed the case and the imaging with the patient  cervical spondylosis with NF narrowing with radiculopathy  discussion of the condition and treatment options cautions discussed questions answered discussion of natural history of the condition and what the next step would be updated MRi"  Lower back pain down the left leg right leg at times  Was in the hospital with some GI issue and was told that was related to his back   xrays today: l spine 4 views - ds at l4-5, loss of disc hieght at L5-S1  AP PELVIS- negative   MRi L spine LHR - 1.  Multilevel degenerative disc disease and facet arthropathy. 2.  Moderate central spinal canal as well as moderate to severe right and moderate left neural foraminal stenosis at L4-5. 3.  Mild central spinal canal as well as moderate to severe left and moderate right neural foraminal stenosis at L5-S1  MRi C spine LHR - C3-C4: Mild disc bulge. Facet arthrosis, right greater than left. Mild spinal canal stenosis. Severe right neuroforaminal stenosis and moderate left neuroforaminal stenosis. C4-C5: Mild disc bulge. Facet arthrosis, right greater than left. Mild spinal canal stenosis. Severe right neuroforaminal stenosis and moderate left neuroforaminal stenosis. C5-C6: Mild disc bulge. Facet arthrosis, left greater than right. Mild spinal canal stenosis. Mild right and moderate left neuroforaminal stenosis. C6-C7: Mild disc bulge secondary to disc undercovering. Mild spinal canal stenosis. Severe right and moderate left neuroforaminal stenosis. C7-T1: Mild disc bulge secondary to disc undercovering. Mild spinal canal stenosis. Moderate bilateral neuroforaminal stenosis..  IMPRESSION:  MRI of the cervical spine demonstrates: Background of congenital cervical spinal stenosis with multilevel degenerative changes of the cervical spine resulting in multilevel mild spinal canal stenosis and multilevel moderate to severe neuroforaminal stenosis as described above.  [] : no [FreeTextEntry5] : ISAAK ARAUJO  a 59 year M  here for evaluation of his neck. Patient states he has history of lower back pain for many years. Pain has worsened over the past 4 years.

## 2024-09-20 NOTE — DISCUSSION/SUMMARY
[Medication Risks Reviewed] : Medication risks reviewed [Surgical risks reviewed] : Surgical risks reviewed [de-identified] : reviewed the case and the imaging with the patient  neck and lower back pain issues discussion of the condition and treatment options cautions discussed questions answered discussion of natural history of the condition and what the next step would be   Discussed with the patient laminecotomy and/or fusion L4-S1   We've discussed the surgery details including instrumentation and grafting options (local, allograft, ICBG, and biologics) as well as potential for complications including but not limited to pain, scar and infection. There is also a possibility for hardware complication such as malposition of hardware, hardware loosening, pullout, failure or fracture of bone, adjacent segment disease, pseudarthrosis, and need for future surgery. We discussed potential for injury to nerves, spinal cord or blood vessels, paralysis, blindness, need for transfusion, general anesthesia, allergic reaction, prolonged intubation, myocardial infarction, stroke, deep venous thrombosis, pulmonary embolus, and death.  Spinal fluid leak may occur and may require prolonged time in the hospital and also further surgical procedures including drain placement.  The patient understands these things and all questions are answered to his/her satisfaction.  We discussed other surgical options.    The patient will need a medical clearance and pre-admission testing prior to surgery We will use neuromonitoring in order to keep things as safe as possible. The procedure does not come with a guarantee of success or of satisfaction on the patient's behalf At the surgeon's discretion he may call for assistance during the surgery or in the perioperative period   I have discussed with the patient for an Anterior Cervical Discectomy & Fusion C4-7    We've discussed the surgery details including instrumentation and grafting options (local, allograft, ICBG, and biologics) as well as potential for complications including but not limited to pain, scar, loss of function, permanent injury, death, need for additional surgery, need for blood transfusion, prolonged hospitalization, prolonged recovery, lack of recovery, blood clots, pulmonary embolism, heart attack, stroke, or infection. There is also a possibility for hardware complication such as malposition of hardware, hardware loosening, pullout, failure or fracture of bone, adjacent segment disease, pseudarthrosis, and need for future surgery. Finally, we discussed potential for injury to nerves, spinal cord or blood vessels, paralysis, blindness, need for transfusion, general anesthesia, allergic reaction, prolonged intubation, myocardial infarction, stroke, deep venous thrombosis, pulmonary embolus, and death. The patient understands these things and all questions are answered tohis/her satisfaction.  The surgery may need to be paused or staged or not completed due to intraoperative events or at the surgeon's discretion.  Any injury that occurs may be permanent Dysphagia is the most common complication and may be permanent.  Spinal fluid leak may occur and may require prolonged time in the hospital or further surgical procedures  Patient has been instructed to stop all Aspirin and NSAIDs 10 days prior to surgery date. For Coumadin and other blood thinners, the patient is referred to the medical doctor We discussed we will use neuromonitioring for the surgery in order to possibly mitigate risks of injury. The procedure does not come with a guarantee of success or of satisfaction on the patient's behalf. At the surgeon's discretion he may call for assistance during the surgery or in the perioperative period We discussed other surgical options as well but this is the surgery that I think would be best in this case  he is going to go to colonscopy first and follow up and we will likely book surgery then

## 2024-09-20 NOTE — HISTORY OF PRESENT ILLNESS
[de-identified] : 8/12/24:  60 y/o male with neck pain since 2019 after a low back surgery.   Was seen bty Dr Quintana with PT since.  Has been under the care of neuro Dr Michelle with trigger injections monthly that have helped somewhat.   There is paresthesias to both hands intermittent. Pain worse at night.  Wakes from sleep. EMG upper ext by neuro a few months ago neg per pt Seens Dr. Disla and [place] from pain management at [facility] with Vicodin and valium with some temp relief.  Failed multiple REMI.   Chiro care for years no adjustments No relief with accupuncture  Has tingling in the lower legs at times   Occupation: [employer]  PMHx Denies No CA Hx  Cervical spine Xrays: C spine with spondylosis   Cervical MRI 2022 [facility] imaging: multiple levels of NF stenosis   -------------------------------------------------------------------------------  09/20/24 - MRI review of cervical / lumbar.  pt states pain continues. Plan at last was "reviewed the case and the imaging with the patient  cervical spondylosis with NF narrowing with radiculopathy  discussion of the condition and treatment options cautions discussed questions answered discussion of natural history of the condition and what the next step would be updated MRi"  Lower back pain down the left leg right leg at times  Was in the hospital with some GI issue and was told that was related to his back   xrays today: l spine 4 views - ds at l4-5, loss of disc hieght at L5-S1  AP PELVIS- negative   MRi L spine LHR - 1.  Multilevel degenerative disc disease and facet arthropathy. 2.  Moderate central spinal canal as well as moderate to severe right and moderate left neural foraminal stenosis at L4-5. 3.  Mild central spinal canal as well as moderate to severe left and moderate right neural foraminal stenosis at L5-S1  MRi C spine L[facility] - C3-C4: Mild disc bulge. Facet arthrosis, right greater than left. Mild spinal canal stenosis. Severe right neuroforaminal stenosis and moderate left neuroforaminal stenosis. C4-C5: Mild disc bulge. Facet arthrosis, right greater than left. Mild spinal canal stenosis. Severe right neuroforaminal stenosis and moderate left neuroforaminal stenosis. C5-C6: Mild disc bulge. Facet arthrosis, left greater than right. Mild spinal canal stenosis. Mild right and moderate left neuroforaminal stenosis. C6-C7: Mild disc bulge secondary to disc undercovering. Mild spinal canal stenosis. Severe right and moderate left neuroforaminal stenosis. C7-T1: Mild disc bulge secondary to disc undercovering. Mild spinal canal stenosis. Moderate bilateral neuroforaminal stenosis..  IMPRESSION:  MRI of the cervical spine demonstrates: Background of congenital cervical spinal stenosis with multilevel degenerative changes of the cervical spine resulting in multilevel mild spinal canal stenosis and multilevel moderate to severe neuroforaminal stenosis as described above.  [] : no [FreeTextEntry5] : ISAAK ARAUJO  a 59 year M  here for evaluation of his neck. Patient states he has history of lower back pain for many years. Pain has worsened over the past 4 years.

## 2024-12-09 ENCOUNTER — APPOINTMENT (OUTPATIENT)
Dept: ORTHOPEDIC SURGERY | Facility: CLINIC | Age: 59
End: 2024-12-09
Payer: MEDICARE

## 2024-12-09 DIAGNOSIS — M48.062 SPINAL STENOSIS, LUMBAR REGION WITH NEUROGENIC CLAUDICATION: ICD-10-CM

## 2024-12-09 DIAGNOSIS — M54.12 RADICULOPATHY, CERVICAL REGION: ICD-10-CM

## 2024-12-09 DIAGNOSIS — M54.50 LOW BACK PAIN, UNSPECIFIED: ICD-10-CM

## 2024-12-09 PROCEDURE — 99215 OFFICE O/P EST HI 40 MIN: CPT

## 2025-01-22 ENCOUNTER — APPOINTMENT (OUTPATIENT)
Dept: ORTHOPEDIC SURGERY | Facility: CLINIC | Age: 60
End: 2025-01-22
Payer: MEDICARE

## 2025-01-22 DIAGNOSIS — M47.812 SPONDYLOSIS W/OUT MYELOPATHY OR RADICULOPATHY, CERVICAL REGION: ICD-10-CM

## 2025-01-22 DIAGNOSIS — M48.02 SPINAL STENOSIS, CERVICAL REGION: ICD-10-CM

## 2025-01-22 PROCEDURE — 99214 OFFICE O/P EST MOD 30 MIN: CPT

## 2025-02-24 ENCOUNTER — APPOINTMENT (OUTPATIENT)
Dept: ORTHOPEDIC SURGERY | Facility: CLINIC | Age: 60
End: 2025-02-24

## 2025-03-13 ENCOUNTER — APPOINTMENT (OUTPATIENT)
Dept: ORTHOPEDIC SURGERY | Facility: HOSPITAL | Age: 60
End: 2025-03-13

## 2025-09-11 ENCOUNTER — APPOINTMENT (OUTPATIENT)
Dept: ORTHOPEDIC SURGERY | Facility: CLINIC | Age: 60
End: 2025-09-11
Payer: MEDICARE

## 2025-09-11 DIAGNOSIS — M48.062 SPINAL STENOSIS, LUMBAR REGION WITH NEUROGENIC CLAUDICATION: ICD-10-CM

## 2025-09-11 PROCEDURE — 99213 OFFICE O/P EST LOW 20 MIN: CPT

## 2025-09-11 PROCEDURE — 73610 X-RAY EXAM OF ANKLE: CPT | Mod: LT

## 2025-09-16 DIAGNOSIS — M25.572 PAIN IN LEFT ANKLE AND JOINTS OF LEFT FOOT: ICD-10-CM
